# Patient Record
Sex: FEMALE
[De-identification: names, ages, dates, MRNs, and addresses within clinical notes are randomized per-mention and may not be internally consistent; named-entity substitution may affect disease eponyms.]

---

## 2018-03-13 PROCEDURE — 88175 CYTOPATH C/V AUTO FLUID REDO: CPT | Performed by: FAMILY MEDICINE

## 2018-03-13 PROCEDURE — 87624 HPV HI-RISK TYP POOLED RSLT: CPT | Performed by: FAMILY MEDICINE

## 2018-06-26 PROBLEM — R39.15 URINARY URGENCY: Status: ACTIVE | Noted: 2018-06-26

## 2018-10-22 ENCOUNTER — CHARTING TRANS (OUTPATIENT)
Dept: OTHER | Age: 48
End: 2018-10-22

## 2018-12-19 ENCOUNTER — HOSPITAL ENCOUNTER (OUTPATIENT)
Dept: BONE DENSITY | Facility: HOSPITAL | Age: 48
Discharge: HOME | End: 2018-12-19
Attending: FAMILY MEDICINE

## 2018-12-19 DIAGNOSIS — Z13.9 SCREENING PROCEDURE: ICD-10-CM

## 2018-12-23 NOTE — PROGRESS NOTES
Evelyn Cavanaugh  The results of your whole body DEXA scan are back. I will release them and you can review the results, and the suggestions in the impression section. Let me know if you have any questions or comments.     Dr. Doreen Hu

## 2019-10-02 ENCOUNTER — HOSPITAL ENCOUNTER (OUTPATIENT)
Dept: BONE DENSITY | Facility: HOSPITAL | Age: 49
Discharge: HOME | End: 2019-10-02
Attending: FAMILY MEDICINE

## 2019-10-02 DIAGNOSIS — E66.9 CLASS 1 OBESITY WITHOUT SERIOUS COMORBIDITY IN ADULT, UNSPECIFIED BMI, UNSPECIFIED OBESITY TYPE: ICD-10-CM

## 2019-10-02 DIAGNOSIS — Z13.820 SCREENING FOR OSTEOPOROSIS: ICD-10-CM

## 2019-10-04 NOTE — PROGRESS NOTES
Lena Hurst    I released the results of your bone density test.  To me it looks better, with a decrease in body fat percentage and estimated visceral adipose tissue area. No muscle wasting.     Dr. Marcelino Mckeon